# Patient Record
Sex: MALE | Race: WHITE | NOT HISPANIC OR LATINO | URBAN - METROPOLITAN AREA
[De-identification: names, ages, dates, MRNs, and addresses within clinical notes are randomized per-mention and may not be internally consistent; named-entity substitution may affect disease eponyms.]

---

## 2017-02-28 ENCOUNTER — EMERGENCY (EMERGENCY)
Facility: HOSPITAL | Age: 67
LOS: 1 days | Discharge: HOME | End: 2017-02-28
Attending: EMERGENCY MEDICINE | Admitting: EMERGENCY MEDICINE
Payer: COMMERCIAL

## 2017-02-28 VITALS
OXYGEN SATURATION: 98 % | WEIGHT: 141.1 LBS | DIASTOLIC BLOOD PRESSURE: 76 MMHG | RESPIRATION RATE: 18 BRPM | SYSTOLIC BLOOD PRESSURE: 138 MMHG | TEMPERATURE: 98 F | HEART RATE: 54 BPM

## 2017-02-28 VITALS
TEMPERATURE: 99 F | OXYGEN SATURATION: 99 % | SYSTOLIC BLOOD PRESSURE: 146 MMHG | DIASTOLIC BLOOD PRESSURE: 70 MMHG | HEART RATE: 56 BPM | RESPIRATION RATE: 18 BRPM
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
## 2017-02-28 DIAGNOSIS — I97.89 OTHER POSTPROCEDURAL COMPLICATIONS AND DISORDERS OF THE CIRCULATORY SYSTEM, NOT ELSEWHERE CLASSIFIED: ICD-10-CM
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
## 2017-02-28 DIAGNOSIS — R94.31 ABNORMAL ELECTROCARDIOGRAM [ECG] [EKG]: ICD-10-CM

## 2017-02-28 DIAGNOSIS — I45.10 UNSPECIFIED RIGHT BUNDLE-BRANCH BLOCK: ICD-10-CM
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
## 2017-02-28 DIAGNOSIS — E03.9 HYPOTHYROIDISM, UNSPECIFIED: ICD-10-CM
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
## 2017-02-28 DIAGNOSIS — Z79.899 OTHER LONG TERM (CURRENT) DRUG THERAPY: ICD-10-CM

## 2017-02-28 LAB
ALBUMIN SERPL ELPH-MCNC: 3.8 G/DL — SIGNIFICANT CHANGE UP (ref 3.4–5)
ALP SERPL-CCNC: 37 U/L — LOW (ref 40–120)
ALT FLD-CCNC: 25 U/L — SIGNIFICANT CHANGE UP (ref 12–42)
ANION GAP SERPL CALC-SCNC: 7 MMOL/L — LOW (ref 9–16)
APTT BLD: 29.2 SEC — SIGNIFICANT CHANGE UP (ref 27.5–37.4)
AST SERPL-CCNC: 17 U/L — SIGNIFICANT CHANGE UP (ref 15–37)
BASOPHILS NFR BLD AUTO: 0.4 % — SIGNIFICANT CHANGE UP (ref 0–2)
BILIRUB SERPL-MCNC: 0.9 MG/DL — SIGNIFICANT CHANGE UP (ref 0.2–1.2)
BUN SERPL-MCNC: 19 MG/DL — SIGNIFICANT CHANGE UP (ref 7–23)
CALCIUM SERPL-MCNC: 8.7 MG/DL — SIGNIFICANT CHANGE UP (ref 8.5–10.5)
CHLORIDE SERPL-SCNC: 104 MMOL/L — SIGNIFICANT CHANGE UP (ref 96–108)
CK MB CFR SERPL CALC: 1.7 NG/ML — SIGNIFICANT CHANGE UP (ref 0.5–3.6)
CK MB CFR SERPL CALC: 2.1 NG/ML — SIGNIFICANT CHANGE UP (ref 0.5–3.6)
CK SERPL-CCNC: 101 U/L — SIGNIFICANT CHANGE UP (ref 39–308)
CK SERPL-CCNC: 113 U/L — SIGNIFICANT CHANGE UP (ref 39–308)
CO2 SERPL-SCNC: 28 MMOL/L — SIGNIFICANT CHANGE UP (ref 22–31)
CREAT SERPL-MCNC: 1.19 MG/DL — SIGNIFICANT CHANGE UP (ref 0.5–1.3)
EOSINOPHIL NFR BLD AUTO: 0.1 % — SIGNIFICANT CHANGE UP (ref 0–6)
GLUCOSE SERPL-MCNC: 88 MG/DL — SIGNIFICANT CHANGE UP (ref 70–99)
HCT VFR BLD CALC: 39.3 % — SIGNIFICANT CHANGE UP (ref 39–50)
HGB BLD-MCNC: 13.4 G/DL — SIGNIFICANT CHANGE UP (ref 13–17)
INR BLD: 1.14 — SIGNIFICANT CHANGE UP (ref 0.88–1.16)
LYMPHOCYTES # BLD AUTO: 4.5 % — LOW (ref 13–44)
MCHC RBC-ENTMCNC: 31.8 PG — SIGNIFICANT CHANGE UP (ref 27–34)
MCHC RBC-ENTMCNC: 34.1 G/DL — SIGNIFICANT CHANGE UP (ref 32–36)
MCV RBC AUTO: 93.1 FL — SIGNIFICANT CHANGE UP (ref 80–100)
MONOCYTES NFR BLD AUTO: 1.2 % — LOW (ref 2–14)
NEUTROPHILS NFR BLD AUTO: 93.8 % — HIGH (ref 43–77)
PLATELET # BLD AUTO: 160 K/UL — SIGNIFICANT CHANGE UP (ref 150–400)
POTASSIUM SERPL-MCNC: 4.3 MMOL/L — SIGNIFICANT CHANGE UP (ref 3.5–5.3)
POTASSIUM SERPL-SCNC: 4.3 MMOL/L — SIGNIFICANT CHANGE UP (ref 3.5–5.3)
PROT SERPL-MCNC: 6.4 G/DL — SIGNIFICANT CHANGE UP (ref 6.4–8.2)
PROTHROM AB SERPL-ACNC: 12.7 SEC — SIGNIFICANT CHANGE UP (ref 10–13.1)
RBC # BLD: 4.22 M/UL — SIGNIFICANT CHANGE UP (ref 4.2–5.8)
RBC # FLD: 13.3 % — SIGNIFICANT CHANGE UP (ref 10.3–16.9)
SODIUM SERPL-SCNC: 139 MMOL/L — SIGNIFICANT CHANGE UP (ref 135–145)
TROPONIN I SERPL-MCNC: 0.02 NG/ML — SIGNIFICANT CHANGE UP (ref 0.01–0.04)
TROPONIN I SERPL-MCNC: <0.015 NG/ML — SIGNIFICANT CHANGE UP (ref 0.01–0.04)
WBC # BLD: 6.9 K/UL — SIGNIFICANT CHANGE UP (ref 3.8–10.5)
WBC # FLD AUTO: 6.9 K/UL — SIGNIFICANT CHANGE UP (ref 3.8–10.5)

## 2017-02-28 PROCEDURE — 85610 PROTHROMBIN TIME: CPT

## 2017-02-28 PROCEDURE — 71045 X-RAY EXAM CHEST 1 VIEW: CPT

## 2017-02-28 PROCEDURE — 82553 CREATINE MB FRACTION: CPT

## 2017-02-28 PROCEDURE — 99284 EMERGENCY DEPT VISIT MOD MDM: CPT | Mod: 25

## 2017-02-28 PROCEDURE — 71010: CPT | Mod: 26

## 2017-02-28 PROCEDURE — 80053 COMPREHEN METABOLIC PANEL: CPT

## 2017-02-28 PROCEDURE — 99285 EMERGENCY DEPT VISIT HI MDM: CPT | Mod: 25

## 2017-02-28 PROCEDURE — 82550 ASSAY OF CK (CPK): CPT

## 2017-02-28 PROCEDURE — 84484 ASSAY OF TROPONIN QUANT: CPT

## 2017-02-28 PROCEDURE — 93010 ELECTROCARDIOGRAM REPORT: CPT | Mod: NC

## 2017-02-28 PROCEDURE — 85025 COMPLETE CBC W/AUTO DIFF WBC: CPT

## 2017-02-28 PROCEDURE — 93005 ELECTROCARDIOGRAM TRACING: CPT

## 2017-02-28 PROCEDURE — 85730 THROMBOPLASTIN TIME PARTIAL: CPT

## 2017-02-28 PROCEDURE — 36415 COLL VENOUS BLD VENIPUNCTURE: CPT

## 2017-02-28 RX ORDER — LEVOTHYROXINE SODIUM 125 MCG
0 TABLET ORAL
Qty: 0 | Refills: 0 | COMMUNITY

## 2017-02-28 NOTE — ED PROVIDER NOTE - PHYSICAL EXAMINATION
Patient has new surgical blepharoplasty scars on upper and lower lids w no bleeding. surrounding ecchymosis present.

## 2017-02-28 NOTE — ED PROVIDER NOTE - OBJECTIVE STATEMENT
68 yo sent from outpatient surgical center. Patient had blepharoplasty of bilateral eyes done electively. During the surgery while under anaesthesia Patient's blood pressure dropped for 20 minutes during which time ST elevations were seen on monitor. no 12 lead done at that time however ST elevations resolved after B.P. improved after fluid bolus. Patient was medically cleared prior to surgery and had normal nuclear stress test one year ago after suspicious ST changes during stress testing. Usually tolerates exercise well w no C.P. although does reports as he gets older he does have to walk more frequently although still tolerates long runs. never had C.P. or SOB or other negative symptoms today,

## 2017-02-28 NOTE — ED ADULT NURSE NOTE - OBJECTIVE STATEMENT
Pt BIBA from Plastics Sx office for EKG changes and drop in SBP.  Pt states "I was having my eyes lifted when this all started."  Pt denies CP, Dizziness, SOB, N/V/D, at any point today.  Pt upgraded to [facility].  EKG obtained upon arrival to ED.  #22 PIV placed by outpaitent Sx office, flushing without complication upon arrival to ED.

## 2017-02-28 NOTE — ED ADULT TRIAGE NOTE - CHIEF COMPLAINT QUOTE
Patient was having cosmetic procedure done today to bilateral upper/lower eyelids and had an abnormal ekg.  Denies chest pain, dizziness, palpitations, sob.  Hx: Hypothyroidism.  ecchymoses and steri-strips to bilateral upper / lower eyes.

## 2017-02-28 NOTE — ED PROVIDER NOTE - MEDICAL DECISION MAKING DETAILS
Patient w ST elevations during surgery on monitor which were associated w anesthesia and hypotension, EKG wnl here w 2 neg trop's. no MI sustained. no symptoms cw angina. intial plan to perform CTA although cardiology dept determined pt unable to undergo test due to eating cereal bar 2 hrs prior and dept has strict 4 hour rule, Patient will f/u with outpt cardiologist, advised also to avoid exercise until cleared by cardiologist and to follow up ASAP, considering underlying asymptomatic stenosis. Pt's cardiologist Dr. Garcia aware of plan. discussed case w him and his partner throughout pt's ER stay.

## 2022-08-23 ENCOUNTER — APPOINTMENT (OUTPATIENT)
Dept: RADIOLOGY | Facility: CLINIC | Age: 72
End: 2022-08-23

## 2022-08-23 PROBLEM — E03.9 HYPOTHYROIDISM, UNSPECIFIED: Chronic | Status: ACTIVE | Noted: 2017-02-28

## 2022-08-23 PROCEDURE — 71046 X-RAY EXAM CHEST 2 VIEWS: CPT

## 2023-12-26 PROBLEM — Z00.00 ENCOUNTER FOR PREVENTIVE HEALTH EXAMINATION: Status: ACTIVE | Noted: 2023-12-26

## 2024-01-14 ENCOUNTER — OUTPATIENT (OUTPATIENT)
Dept: OUTPATIENT SERVICES | Facility: HOSPITAL | Age: 74
LOS: 1 days | Discharge: HOME | End: 2024-01-14

## 2024-01-14 DIAGNOSIS — Z31.5 ENCOUNTER FOR PROCREATIVE GENETIC COUNSELING: ICD-10-CM

## 2024-01-17 ENCOUNTER — APPOINTMENT (OUTPATIENT)
Dept: HEMATOLOGY ONCOLOGY | Facility: CLINIC | Age: 74
End: 2024-01-17

## 2024-01-17 NOTE — DISCUSSION/SUMMARY
[FreeTextEntry1] : REASON FOR CONSULT Amado Xie is a 73-year-old male who self-referred for cancer genetic counseling and risk assessment due to his family history of cancer.   RELEVANT MEDICAL HISTORY Mr. 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 The first squamous cell carcinoma in situ was identified on the R-malar region in 8/2021 with Mohs performed on 10/05/2021. An early evolving squamous cell in situ was identified in 2022 on his scalp. Lastly, fragments consistent with squamous cell carcinoma were identified on the scalp in 2023 with Mohs performed on 2/7/2023.  He also has a family history of pancreatic cancer, see below.  OTHER MEDICAL AND SURGICAL HISTORY: Medical: Denies Surgical: Tonsillectomy, Hernia Repair  CANCER SCREENING HISTORY:   Colon: -	Most recent colonoscopy: 3-4 years ago; Results: reportedly 0 polyps; Frequency: every 5 years -	Total Polyps: 0 per patient Skin:   -	Most recent skin exam: 9/2023; Results: no concerning lesions removed; Frequency: yearly Prostate:  -	Most recent PSA: 2023; Results: reportedly normal; Frequency: yearly, Scheduled for annual PCP in 9/2024 per Mr. Xie -	MICHELINE: Yes; Results: reportedly no concerns   SOCIAL HISTORY: -	Tobacco-product use: Never smoker -	Environmental exposure: No  FAMILY HISTORY: Maternal ancestry and paternal ancestry was reported as [place] (Ashkenazi Catholic). A detailed family history of cancer was ascertained. Relevant diagnoses are detailed below and in the scanned pedigree.   To Mr. Xie's knowledge, no one in the family has had germline testing for cancer susceptibility.    RISK ASSESSMENT: Mr. Xie's family history of pancreatic cancer in his father is suggestive of an inherited predisposition to pancreatic cancer and related cancers. We also discussed that approximately 1 in 40 individuals with Ashkenazi Catholic ancestry carry a mutation in the BRCA1/2 genes. We reviewed that Mr. Xie does not meet Medicare criteria, and he understood he will have to pay the laboratory's self-pay option of ~$250 if genetic testing is pursued. Mr. Xie would like to pursue genetic testing as he is concerned about his family history of cancer and would like to obtain hereditary cancer predisposition information. We therefore recommended genetic testing for [facility] Pancreatic Cancer Panel. This test analyzes 18 genes: APC, CORI, BRCA1, BRCA2, CDKN2A, EPCAM, MEN1, MLH1, MSH2, MSH6, NF1, PALB2, PMS2, STK11, TP53, TSC1, TSC2, VHL.  We discussed the risks, benefits and limitations, and implications of genetic testing. We also discussed the psychosocial implications of genetic testing. Possible test results were reviewed with Mr. Xie, along with associated medical management options. The Genetic Information Non-discrimination Act (GARRETT) was also reviewed.   Mr. Xie consented to the above-mentioned genetic testing panel. Blood was drawn in our laboratory and sent to [facility] today.  PLAN:  1.	Blood drawn today will be sent to [facility] for analysis.  2.	We will contact Mr. Xie once the results are available and will schedule a follow-up appointment, as needed. Results generally return in 2-3 weeks from the day the sample is received in the lab.   For any additional questions please call [facility]etics at [phone].    Radha Grover MS, [facility] Genetic Counselor, Cancer Genetics   CC:  Patient

## 2024-01-31 ENCOUNTER — NON-APPOINTMENT (OUTPATIENT)
Age: 74
End: 2024-01-31

## 2024-01-31 NOTE — DISCUSSION/SUMMARY
[FreeTextEntry1] : RESULTS TRANSMISSION Amado Xie is a 73-year-old male who was called on 1/31/2024 for a discussion regarding his genetic testing results related to hereditary cancer predisposition.   Mr. Xie was originally seen at [facility] on 1/17/2024 for hereditary cancer predisposition risk assessment. Mr. 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 He also has a family history of pancreatic cancer in his father. Mr. Xie decided to pursue genetic testing using [facility] Pancreatic Cancer Panel.  TEST RESULTS: NEGATIVE  No pathogenic (disease-causing) variants or VUSs were detected in the following 18 genes:  APC, CORI, BRCA1, BRCA2, CDKN2A, EPCAM, MEN1, MLH1, MSH2, MSH6, NF1, PALB2, PMS2, STK11, TP53, TSC1, TSC2, VHL  RESULTS INTERPRETATION AND ASSESSMENT: Given Mr. Xie's personal and current reported family history of cancer, and his negative genetic test results, the following screening guidelines and risk-reducing recommendations were discussed:  PANCREAS:  - At this time, there is no proven effective screening for pancreatic cancer, though the National Comprehensive Cancer Network (NCCN) states that investigational screening may be considered for individuals who have a family history of the disease in >=1 first-degree and >=1 second-degree relative from the same side of the family, even in the absence of a known pathogenic/likely pathogenic germline variant.  - Given Mr. Xie's current reported family history, screening is not recommended at this time. Mr. Xie was however informed, that criteria for participating in investigational pancreatic cancer screening may change in the future, and Mr. Xie was encouraged to re-contact the Cancer Genetics team every 2-3 years to discuss any possible changes in screening recommendations.  DERM:  -Long-term management and surveillance should be based on Mr. Xie's post-treatment protocol as recommended by his care team.  OTHER:  - In the absence of other indications, Mr. Xie should practice age-appropriate cancer screening of other organ systems as recommended for the general population.  We also discussed that, while no cause of the patient's personal and family history of cancer was identified, this result, while reassuring, does entirely not rule out a hereditary cancer risk in the patient. It is possible, although unlikely, the patient has a mutation in one of the genes tested that is not detectable by this analysis, or has a mutation in a different gene, either known or unknown. It is also possible there is a hereditary cancer predisposition in the family, but the patient did not inherit it.  We informed Mr. Xie that our knowledge of genetics and inherited cancer conditions is changing rapidly. Therefore, we recommended that Mr. Xie contact our office, every 2 to 3 years, to discuss relevant advances in cancer genetics.  We emphasized the importance of re-contacting us with updates regarding his personal and family history of cancer as well as any updates regarding additional cancer genetic test results performed for the patient and/or family members.  Such updates could possibly change our risk assessment and recommendations.   Following results disclosure, Mr. Xie inquired as to whether there are additional hereditary cancer predisposition genes available to analyze in addition to the pancreatic cancer-associated genes already tested. We discussed expanded panel testing options, and Mr. Xie elected to pursue reflex genetic testing which includes [facility] Common Hereditary Cancers Panel and Basal Cell Nevus Syndrome Panel. He was informed I would contact him once these additional results are available.  PLAN: 1.	Patient informed he will be contacted with the results of reflex genetic test results when they are available.  2.      See above for recommended screening and risk-reduction strategies. 3.	Patient informed consult note(s) will be available through their [facility] patient portal and genetic test results will be released via [facility] laboratory portal.  4.	Mr. Xie was encouraged to contact us every 2-3 years to discuss relevant advances in cancer genetics, or sooner if there are any changes in his personal or family history of cancer.   For any additional questions please call Cancer Genetics at [phone].    Radha Grover MS, [facility] Genetic Counselor, Cancer Genetics   CC:  Patient

## 2024-02-01 ENCOUNTER — NON-APPOINTMENT (OUTPATIENT)
Age: 74
End: 2024-02-01

## 2024-02-01 NOTE — DISCUSSION/SUMMARY
[FreeTextEntry1] : RESULTS TRANSMISSION Amado Xie is a 73-year-old male who was called on 2/1/2024 for a discussion regarding his reflex genetic testing results related to hereditary cancer predisposition. Mr. Xie originally decided to pursue genetic testing using [facility] Pancreatic Cancer Panel and subsequently requested to pursue expanded testing using [facility] Basal Cell Nevus Syndrome Panel and Common Hereditary Cancers Panel.  TEST RESULTS: NEGATIVE  No pathogenic (disease-causing) variants or VUSs were detected in the following 50 genes: APC, CORI, AXIN2, BAP1, BARD1, BMPR1A, BRCA1, BRCA2, BRIP1, CDH1, CDK4, CDKN2A, CHEK2, CTNNA1, DICER1, EPCAM, FH, GREM1, HOXB13, KIT, MBD4, MEN1, MLH1, MSH2, MSH3, MSH6, MUTYH, NF1, NTHL1, PALB2, PDGFRA, PMS2, POLD1, POLE, PTCH1, PTEN, RAD51C, RAD51D, SDHA, SDHB, SDHC, SDHD, SMAD4, SMARCA4, STK11, SUFU, TP53, TSC1, TSC2, VHL  Mr. Xie was informed of his negative genetic test results such that no disease-causing mutations or VUSs were identified. He was encouraged to pursue the recommended screening and risk-reduction strategies as discussed on 1/31/2024 and to follow-up with our team per discussion on 1/31/2024. Mr. Xie was encouraged to contact us every 2-3 years to discuss relevant advances in cancer genetics, or sooner if there are any changes in his personal or family history of cancer. Genetic test results were released via [facility] laboratory portal.   Radha Grover MS, [facility] Genetic Counselor, Cancer Genetics   CC:  Patient

## 2024-04-16 ENCOUNTER — APPOINTMENT (OUTPATIENT)
Dept: ORTHOPEDIC SURGERY | Facility: CLINIC | Age: 74
End: 2024-04-16
Payer: MEDICARE

## 2024-04-16 ENCOUNTER — APPOINTMENT (OUTPATIENT)
Dept: ORTHOPEDIC SURGERY | Facility: CLINIC | Age: 74
End: 2024-04-16

## 2024-04-16 PROCEDURE — 28470 CLTX METATARSAL FX WO MNP EA: CPT | Mod: T4

## 2024-04-16 PROCEDURE — 99204 OFFICE O/P NEW MOD 45 MIN: CPT | Mod: 57

## 2024-04-16 PROCEDURE — L4361: CPT | Mod: KX,LT

## 2024-04-16 NOTE — DISCUSSION/SUMMARY
[de-identified] : I reviewed patient's radiographs and discussed his condition and treatment options.  I advised immobilization in CAM boot and provided him with one today.  Follow up in 3 weeks XRs foot.  Patient voiced understanding and agreement with the plan.

## 2024-04-16 NOTE — HISTORY OF PRESENT ILLNESS
[de-identified] :  Patient presents for evaluation on LT ankle/foot pain and right wrist pain.  He was playing pickle ball and fell backwards onto right wrist and left leg on 4/11/24.  He presented to [facility] the following day and saw PA who ordered XRs and diagnosed him with metatarsal fracture.  He was given postop shoe.  He reports continued foot pain and wrist pain and would like to discuss further treatment with a physician.

## 2024-04-16 NOTE — PHYSICAL EXAM
[Dorsal Wrist] : dorsal wrist [Right] : right wrist [There are no fractures, subluxations or dislocations. No significant abnormalities are seen] : There are no fractures, subluxations or dislocations. No significant abnormalities are seen [FreeTextEntry8] : severe thumb basal joint OA [Moderate] : moderate swelling of lateral foot [5th] : 5th [NL (40)] : plantar flexion 40 degrees [4___] : dorsiflexion 4[unfilled]/5 [2+] : posterior tibialis pulse: 2+ [] : patient ambulates without assistive device [Left] : left foot [Outside films reviewed] : Outside films reviewed [FreeTextEntry3] : skin intact out of hard sole shoe [de-identified] : base of 5th metatarsal fracture [TWNoteComboBox7] : dorsiflexion 0 degrees

## 2024-05-03 ENCOUNTER — APPOINTMENT (OUTPATIENT)
Dept: ORTHOPEDIC SURGERY | Facility: CLINIC | Age: 74
End: 2024-05-03
Payer: MEDICARE

## 2024-05-03 PROCEDURE — 99024 POSTOP FOLLOW-UP VISIT: CPT

## 2024-05-03 NOTE — PHYSICAL EXAM
[5th] : 5th [NL (40)] : plantar flexion 40 degrees [4___] : dorsiflexion 4[unfilled]/5 [2+] : posterior tibialis pulse: 2+ [Left] : left foot [] : no ecchymosis [The fracture is in acceptable alignment. There is progression in healing seen] : The fracture is in acceptable alignment. There is progression in healing seen [FreeTextEntry3] : skin intact out of CAM boot [de-identified] : healing base of 5th metatarsal fracture [TWNoteComboBox7] : dorsiflexion 0 degrees

## 2024-05-03 NOTE — HISTORY OF PRESENT ILLNESS
[de-identified] :  Since last visit, patient presents in cam boot as directed. Patient states improvement in foot but concerned about persistent right wrist pain.

## 2024-05-03 NOTE — DISCUSSION/SUMMARY
[de-identified] : I reviewed patient's radiographs and discussed his condition and treatment course.  Follow up in 3 weeks XRs.  Patient voiced understanding and agreement with the plan.

## 2024-05-24 ENCOUNTER — APPOINTMENT (OUTPATIENT)
Dept: ORTHOPEDIC SURGERY | Facility: CLINIC | Age: 74
End: 2024-05-24
Payer: MEDICARE

## 2024-05-24 PROCEDURE — 99024 POSTOP FOLLOW-UP VISIT: CPT

## 2024-05-24 PROCEDURE — 73630 X-RAY EXAM OF FOOT: CPT | Mod: LT

## 2024-05-24 NOTE — HISTORY OF PRESENT ILLNESS
[de-identified] :  Since last visit, patient presents in cam boot as directed. Patient states improvement in foot pain, concerned about continued wrist pain.

## 2024-05-24 NOTE — PHYSICAL EXAM
[5th] : 5th [NL (40)] : plantar flexion 40 degrees [4___] : dorsiflexion 4[unfilled]/5 [2+] : posterior tibialis pulse: 2+ [Left] : left foot [The fracture is in acceptable alignment. There is progression in healing seen] : The fracture is in acceptable alignment. There is progression in healing seen [] : no metatarsal tenderness [FreeTextEntry3] : skin intact out of hard sole shoe [de-identified] : healing base of 5th metatarsal fracture [TWNoteComboBox7] : dorsiflexion 10 degrees

## 2024-05-24 NOTE — DISCUSSION/SUMMARY
[de-identified] : I reviewed patient's radiographs and discussed his condition and treatment course.  Continue immobilization in hard sole shoe.  Follow up in 4 weeks XRs likely transition to sneakers then.  Patient voiced understanding and agreement with the plan.

## 2024-06-12 ENCOUNTER — APPOINTMENT (OUTPATIENT)
Dept: ORTHOPEDIC SURGERY | Facility: CLINIC | Age: 74
End: 2024-06-12
Payer: MEDICARE

## 2024-06-12 DIAGNOSIS — S92.355D NONDISPLACED FRACTURE OF FIFTH METATARSAL BONE, LEFT FOOT, SUBSEQUENT ENCOUNTER FOR FRACTURE WITH ROUTINE HEALING: ICD-10-CM

## 2024-06-12 PROCEDURE — 73630 X-RAY EXAM OF FOOT: CPT | Mod: LT

## 2024-06-12 PROCEDURE — 99024 POSTOP FOLLOW-UP VISIT: CPT

## 2024-06-12 NOTE — HISTORY OF PRESENT ILLNESS
[de-identified] :  Since last visit, patient presents in hard sole shoe as directed. Patient states improvement in foot pain, concerned about continued wrist pain.

## 2024-06-12 NOTE — PHYSICAL EXAM
[5th] : 5th [NL (40)] : plantar flexion 40 degrees [2+] : posterior tibialis pulse: 2+ [Left] : left foot [The fracture is in acceptable alignment. There is progression in healing seen] : The fracture is in acceptable alignment. There is progression in healing seen [] : no metatarsal tenderness [5___] : plantar flexion 5[unfilled]/5 [FreeTextEntry3] : skin intact out of hard sole shoe [de-identified] : healing base of 5th metatarsal fracture [TWNoteComboBox7] : dorsiflexion 10 degrees

## 2024-06-12 NOTE — DISCUSSION/SUMMARY
[de-identified] : I reviewed patient's radiographs and discussed his condition and treatment course.  Return to regular shoe wear.  Gradually resume activities as tolerated.  Follow up as needed.  Patient voiced understanding and agreement with the plan.

## 2025-03-12 ENCOUNTER — NON-APPOINTMENT (OUTPATIENT)
Age: 75
End: 2025-03-12

## 2025-03-21 ENCOUNTER — APPOINTMENT (OUTPATIENT)
Dept: ORTHOPEDIC SURGERY | Facility: CLINIC | Age: 75
End: 2025-03-21
Payer: MEDICARE

## 2025-03-21 DIAGNOSIS — E78.00 PURE HYPERCHOLESTEROLEMIA, UNSPECIFIED: ICD-10-CM

## 2025-03-21 PROBLEM — S61.012A LACERATION OF LEFT THUMB WITHOUT FOREIGN BODY WITHOUT DAMAGE TO NAIL, INITIAL ENCOUNTER: Status: ACTIVE | Noted: 2025-03-21

## 2025-03-21 PROCEDURE — 99213 OFFICE O/P EST LOW 20 MIN: CPT

## 2025-03-21 RX ORDER — ALFUZOSIN HYDROCHLORIDE 10 MG/1
TABLET, EXTENDED RELEASE ORAL
Refills: 0 | Status: ACTIVE | COMMUNITY

## 2025-03-21 RX ORDER — FLUOXETINE HCL 20 MG/5 ML
20 SOLUTION, ORAL ORAL
Refills: 0 | Status: ACTIVE | COMMUNITY

## 2025-03-21 RX ORDER — SIMVASTATIN 80 MG/1
TABLET, FILM COATED ORAL
Refills: 0 | Status: ACTIVE | COMMUNITY

## 2025-03-25 ENCOUNTER — APPOINTMENT (OUTPATIENT)
Dept: ORTHOPEDIC SURGERY | Facility: CLINIC | Age: 75
End: 2025-03-25
Payer: MEDICARE

## 2025-03-25 DIAGNOSIS — S61.012A LACERATION W/OUT FOREIGN BODY OF LEFT THUMB W/OUT DAMAGE TO NAIL, INITIAL ENCOUNTER: ICD-10-CM

## 2025-03-25 PROCEDURE — 99213 OFFICE O/P EST LOW 20 MIN: CPT
